# Patient Record
Sex: MALE | Race: WHITE | NOT HISPANIC OR LATINO | Employment: FULL TIME | URBAN - METROPOLITAN AREA
[De-identification: names, ages, dates, MRNs, and addresses within clinical notes are randomized per-mention and may not be internally consistent; named-entity substitution may affect disease eponyms.]

---

## 2023-06-25 DIAGNOSIS — R35.1 NOCTURIA: ICD-10-CM

## 2023-06-25 DIAGNOSIS — I10 ESSENTIAL (PRIMARY) HYPERTENSION: ICD-10-CM

## 2023-06-30 RX ORDER — LOSARTAN POTASSIUM 50 MG/1
TABLET ORAL
Qty: 90 TABLET | Refills: 1 | Status: SHIPPED | OUTPATIENT
Start: 2023-06-30 | End: 2023-12-26

## 2023-06-30 RX ORDER — TAMSULOSIN HYDROCHLORIDE 0.4 MG/1
CAPSULE ORAL
Qty: 90 CAPSULE | Refills: 1 | Status: SHIPPED | OUTPATIENT
Start: 2023-06-30 | End: 2023-12-26

## 2023-11-21 NOTE — PROGRESS NOTES
Subjective   Sunny Ray is a 58 y.o. male who presents for Sunny is here for physical exam.  HPI  Sunny is 58-year-old male is here for his yearly physical exam he takes medication as prescribed on losartan rosuvastatin tamsulosin for history of hypertension dyslipidemia and benign prostatic hypertrophy respectively patient denies fever chills nausea vomiting constipation diarrhea dysuria urgency frequency.  Review of Systems  10 systems are pertinent as above  Objective     Visit Vitals  /80   Pulse 74   Temp 36.5 °C (97.7 °F)   Resp 16      Physical Exam  HEENT: Atraumatic normocephalic the pupils are equal and round and reactive to light the sclerae nonicteric extraocular motion are intact.  Neck: Is supple without JVD no carotid bruits the trachea is midline there are no masses pulses are equal and bilateral with normal upstroke.  Skin: Normal.  Skin good texture.  Moist.  Good turgor.  No lesions, no rashes.  Lymph: No lymphadenopathy appreciated, no masses, no lesions  Lungs: Are clear to auscultation and percussion, good breath sounds bilaterally, no rhonchi, no wheezing, good diaphragmatic excursion.  Heart: Normal rate and normal rhythm S1, S2, no S3, no gallop, murmur or rub.  Abdomen: Soft, nontender, no organomegaly, good bowel sounds.    Extremities: Full range of motion, good pulses bilateral.  No cyanosis, no clubbing or edema.  Neuro: Cranial nerves II-XII are grossly intact there is no sensory or motor deficits.  Able to move all extremities.      Assessment/Plan     Patient is here for a physical exam fasting blood work    CBC BMP lipids AST ALT vitamin D 25-hydroxy    Prevention   Colonoscopy b02/21/23  PSA    Rt hip replaced dr hall  Doing well     Immunization  Flu vaccine  valeria just had covid  Pneumonia vaccine age 64  Shingrix no  COVID-vaccine had covid early November 2023    Muscle statins  for two weeks for   Patient prefers to take in the setting of elevated  calacium score      Continue with the low-fat, low-cholesterol diet,  I recommended Mediterranean diet, which include fish, chicken, vegetables and olive oil  Exercise daily for 30 minutes at least 3 times a week  Continue home medications    Hypertension  No added salt diet, do not and salt to your food  Try to exercise every other day for 30 minutes  Continue current medications    Enlarged prostate    External hemorrhoid colorectal surgery  Re commanded observation    Anxiety and depression  On lexapro 5 mg daily   Wellbutrin 450 mg daily  Follows with psychiatry     Diverticulosis colonoscopy 2/21/23  Problem List Items Addressed This Visit       Tubular adenoma of colon    Relevant Orders    Basic Metabolic Panel    Nocturia    Relevant Orders    PSA    Obstructive sleep apnea    Relevant Orders    CBC    Basic Metabolic Panel    S/P total right hip arthroplasty    SLAP tear of shoulder    Depression - Primary    Relevant Medications    escitalopram (Lexapro) 5 mg tablet    buPROPion XL (Wellbutrin XL) 150 mg 24 hr tablet    buPROPion XL (Wellbutrin XL) 300 mg 24 hr tablet    Dyslipidemia    Relevant Orders    Lipid Panel    AST    ALT    Basic Metabolic Panel    Arthralgia    Relevant Medications    aspirin 81 mg EC tablet    Pain in both hands    Relevant Orders    Referral to Orthopaedic Surgery    Physical exam         Sy Houston MD

## 2023-11-28 ENCOUNTER — OFFICE VISIT (OUTPATIENT)
Dept: PRIMARY CARE | Facility: CLINIC | Age: 58
End: 2023-11-28
Payer: COMMERCIAL

## 2023-11-28 VITALS
SYSTOLIC BLOOD PRESSURE: 124 MMHG | BODY MASS INDEX: 28.14 KG/M2 | RESPIRATION RATE: 16 BRPM | HEIGHT: 69 IN | HEART RATE: 74 BPM | WEIGHT: 190 LBS | TEMPERATURE: 97.7 F | DIASTOLIC BLOOD PRESSURE: 80 MMHG

## 2023-11-28 DIAGNOSIS — M79.641 PAIN IN BOTH HANDS: ICD-10-CM

## 2023-11-28 DIAGNOSIS — M79.642 PAIN IN BOTH HANDS: ICD-10-CM

## 2023-11-28 DIAGNOSIS — E78.5 DYSLIPIDEMIA: ICD-10-CM

## 2023-11-28 DIAGNOSIS — Z00.00 PHYSICAL EXAM: ICD-10-CM

## 2023-11-28 DIAGNOSIS — F32.A DEPRESSION, UNSPECIFIED DEPRESSION TYPE: Primary | ICD-10-CM

## 2023-11-28 DIAGNOSIS — D12.6 TUBULAR ADENOMA OF COLON: ICD-10-CM

## 2023-11-28 DIAGNOSIS — Z96.641 S/P TOTAL RIGHT HIP ARTHROPLASTY: ICD-10-CM

## 2023-11-28 DIAGNOSIS — G47.33 OBSTRUCTIVE SLEEP APNEA: ICD-10-CM

## 2023-11-28 DIAGNOSIS — R35.1 NOCTURIA: ICD-10-CM

## 2023-11-28 DIAGNOSIS — M25.50 ARTHRALGIA, UNSPECIFIED JOINT: ICD-10-CM

## 2023-11-28 DIAGNOSIS — S43.431A SUPERIOR GLENOID LABRUM LESION OF RIGHT SHOULDER, INITIAL ENCOUNTER: ICD-10-CM

## 2023-11-28 PROBLEM — S43.439A SLAP TEAR OF SHOULDER: Status: ACTIVE | Noted: 2023-11-28

## 2023-11-28 PROCEDURE — 84460 ALANINE AMINO (ALT) (SGPT): CPT | Performed by: INTERNAL MEDICINE

## 2023-11-28 PROCEDURE — 84450 TRANSFERASE (AST) (SGOT): CPT | Performed by: INTERNAL MEDICINE

## 2023-11-28 PROCEDURE — 99396 PREV VISIT EST AGE 40-64: CPT | Performed by: INTERNAL MEDICINE

## 2023-11-28 PROCEDURE — 85025 COMPLETE CBC W/AUTO DIFF WBC: CPT | Performed by: INTERNAL MEDICINE

## 2023-11-28 PROCEDURE — 80061 LIPID PANEL: CPT | Performed by: INTERNAL MEDICINE

## 2023-11-28 PROCEDURE — 84153 ASSAY OF PSA TOTAL: CPT | Performed by: INTERNAL MEDICINE

## 2023-11-28 PROCEDURE — 80048 BASIC METABOLIC PNL TOTAL CA: CPT | Performed by: INTERNAL MEDICINE

## 2023-11-28 PROCEDURE — 1036F TOBACCO NON-USER: CPT | Performed by: INTERNAL MEDICINE

## 2023-11-28 RX ORDER — ASPIRIN 81 MG/1
81 TABLET ORAL DAILY
Qty: 30 TABLET | Refills: 11
Start: 2023-11-28 | End: 2023-12-04

## 2023-11-28 RX ORDER — ESCITALOPRAM OXALATE 5 MG/1
5 TABLET ORAL DAILY
Qty: 30 TABLET | Refills: 2
Start: 2023-11-28 | End: 2024-02-26

## 2023-11-28 RX ORDER — BUPROPION HYDROCHLORIDE 300 MG/1
300 TABLET ORAL EVERY MORNING
Qty: 30 TABLET | Refills: 1
Start: 2023-11-28 | End: 2024-01-27

## 2023-11-28 RX ORDER — BUPROPION HYDROCHLORIDE 150 MG/1
150 TABLET ORAL EVERY MORNING
Qty: 30 TABLET | Refills: 1
Start: 2023-11-28 | End: 2024-01-27

## 2023-11-28 ASSESSMENT — PAIN SCALES - GENERAL: PAINLEVEL: 0-NO PAIN

## 2023-11-28 ASSESSMENT — ENCOUNTER SYMPTOMS
DEPRESSION: 0
LOSS OF SENSATION IN FEET: 0
OCCASIONAL FEELINGS OF UNSTEADINESS: 0

## 2023-12-03 DIAGNOSIS — M25.50 ARTHRALGIA, UNSPECIFIED JOINT: ICD-10-CM

## 2023-12-04 RX ORDER — ASPIRIN 81 MG/1
81 TABLET ORAL DAILY
Qty: 90 TABLET | Refills: 3 | Status: SHIPPED | OUTPATIENT
Start: 2023-12-04

## 2023-12-15 DIAGNOSIS — M79.641 BILATERAL HAND PAIN: Primary | ICD-10-CM

## 2023-12-15 DIAGNOSIS — M79.642 BILATERAL HAND PAIN: Primary | ICD-10-CM

## 2023-12-18 ENCOUNTER — ANCILLARY PROCEDURE (OUTPATIENT)
Dept: RADIOLOGY | Facility: CLINIC | Age: 58
End: 2023-12-18
Payer: COMMERCIAL

## 2023-12-18 ENCOUNTER — OFFICE VISIT (OUTPATIENT)
Dept: ORTHOPEDIC SURGERY | Facility: CLINIC | Age: 58
End: 2023-12-18
Payer: COMMERCIAL

## 2023-12-18 VITALS — HEIGHT: 69 IN | BODY MASS INDEX: 28.14 KG/M2 | WEIGHT: 190 LBS

## 2023-12-18 DIAGNOSIS — M25.522 LEFT ELBOW PAIN: ICD-10-CM

## 2023-12-18 DIAGNOSIS — M79.642 PAIN IN BOTH HANDS: ICD-10-CM

## 2023-12-18 DIAGNOSIS — M19.031 ARTHRITIS OF RIGHT WRIST: Primary | ICD-10-CM

## 2023-12-18 DIAGNOSIS — M79.641 PAIN IN BOTH HANDS: ICD-10-CM

## 2023-12-18 DIAGNOSIS — M79.641 BILATERAL HAND PAIN: ICD-10-CM

## 2023-12-18 DIAGNOSIS — M79.642 BILATERAL HAND PAIN: ICD-10-CM

## 2023-12-18 PROCEDURE — 73080 X-RAY EXAM OF ELBOW: CPT | Mod: LT,FY

## 2023-12-18 PROCEDURE — 73080 X-RAY EXAM OF ELBOW: CPT | Mod: LEFT SIDE | Performed by: RADIOLOGY

## 2023-12-18 PROCEDURE — 99214 OFFICE O/P EST MOD 30 MIN: CPT | Performed by: ORTHOPAEDIC SURGERY

## 2023-12-18 PROCEDURE — 73130 X-RAY EXAM OF HAND: CPT | Mod: 50,FY

## 2023-12-18 PROCEDURE — 20605 DRAIN/INJ JOINT/BURSA W/O US: CPT | Performed by: ORTHOPAEDIC SURGERY

## 2023-12-18 PROCEDURE — 1036F TOBACCO NON-USER: CPT | Performed by: ORTHOPAEDIC SURGERY

## 2023-12-18 PROCEDURE — L3908 WHO COCK-UP NONMOLDE PRE OTS: HCPCS | Performed by: ORTHOPAEDIC SURGERY

## 2023-12-18 PROCEDURE — 73130 X-RAY EXAM OF HAND: CPT | Mod: BILATERAL PROCEDURE | Performed by: RADIOLOGY

## 2023-12-18 RX ORDER — LIDOCAINE HYDROCHLORIDE 10 MG/ML
1 INJECTION INFILTRATION; PERINEURAL
Status: COMPLETED | OUTPATIENT
Start: 2023-12-18 | End: 2023-12-18

## 2023-12-18 RX ORDER — TRIAMCINOLONE ACETONIDE 40 MG/ML
40 INJECTION, SUSPENSION INTRA-ARTICULAR; INTRAMUSCULAR
Status: COMPLETED | OUTPATIENT
Start: 2023-12-18 | End: 2023-12-18

## 2023-12-18 RX ADMIN — LIDOCAINE HYDROCHLORIDE 1 ML: 10 INJECTION INFILTRATION; PERINEURAL at 18:52

## 2023-12-18 RX ADMIN — TRIAMCINOLONE ACETONIDE 40 MG: 40 INJECTION, SUSPENSION INTRA-ARTICULAR; INTRAMUSCULAR at 18:52

## 2023-12-18 ASSESSMENT — PAIN - FUNCTIONAL ASSESSMENT: PAIN_FUNCTIONAL_ASSESSMENT: 0-10

## 2023-12-18 ASSESSMENT — PAIN DESCRIPTION - DESCRIPTORS: DESCRIPTORS: ACHING

## 2023-12-18 ASSESSMENT — PAIN SCALES - GENERAL: PAINLEVEL_OUTOF10: 4

## 2023-12-18 NOTE — PROGRESS NOTES
58-year-old right-hand-dominant  presents today for evaluation of right wrist and left elbow pain.  His primary complaint is of pain and swelling of the dorsal aspect of the right wrist.  He has difficulty with forceful gripping and weightbearing type activities.  He has noticed some slight stiffness with wrist range of motion.  No preceding trauma.  He has not had any formal workup or treatment.  On his left upper extremity he has pain over the lateral aspect of the left elbow.  This was very noticeable during the golf season.  Pain with golfing type activities.  He also has not had any formal workup or treatment for his left elbow symptoms.    Patient Active Problem List   Diagnosis    Tubular adenoma of colon    Nocturia    Obstructive sleep apnea    S/P total right hip arthroplasty    SLAP tear of shoulder    Depression    Dyslipidemia    Arthralgia    Pain in both hands    Physical exam     Past Surgical History:   Procedure Laterality Date    COLONOSCOPY  05/13/2014    Colonoscopy (Fiberoptic)    HERNIA REPAIR  05/13/2014    Hernia Repair    SHOULDER SURGERY  05/13/2014    Shoulder Surgery    TONSILLECTOMY  05/13/2014    Tonsillectomy     No family history on file.  Current Outpatient Medications on File Prior to Visit   Medication Sig Dispense Refill    aspirin 81 mg EC tablet TAKE 1 TABLET BY MOUTH EVERY DAY 90 tablet 3    buPROPion XL (Wellbutrin XL) 150 mg 24 hr tablet Take 1 tablet (150 mg) by mouth once daily in the morning. Do not crush, chew, or split. 30 tablet 1    buPROPion XL (Wellbutrin XL) 300 mg 24 hr tablet Take 1 tablet (300 mg) by mouth once daily in the morning. Do not crush, chew, or split. 30 tablet 1    escitalopram (Lexapro) 5 mg tablet Take 1 tablet (5 mg) by mouth once daily. 30 tablet 2    losartan (Cozaar) 50 mg tablet TAKE 1 TABLET BY MOUTH EVERY DAY 90 tablet 1    rosuvastatin (Crestor) 20 mg tablet TAKE 1 TABLET BY MOUTH EVERY DAY 90 tablet 2    tamsulosin (Flomax) 0.4 mg  24 hr capsule TAKE 1 CAPSULE BY MOUTH EVERY DAY 90 capsule 1     No current facility-administered medications on file prior to visit.     No Known Allergies    Physical Examination Findings:  Constitutional: Appears well-developed and well-nourished.  Head: Normocephalic and atraumatic.  Eyes: Pupils are equal and round.  Cardiovascular: Intact distal pulses.   Respiratory: Effort normal. No respiratory distress.  Neurologic: Alert and oriented to person, place, and time.  Skin: Skin is warm and dry.  Hematologic / Lympahtic: No lymphedema, lymphangitis.  Psychiatric: normal mood and affect. Behavior is normal.   Musculoskeletal: Left upper extremity examination reveals tenderness to palpation and swelling over the common extensor origin.  Pain with resisted wrist extension and middle finger extension.  Pain with forceful .  Examination of the right wrist reveals fourth dorsal compartment tenosynovial swelling.  Tenderness to palpation dorsally over the fourth compartment and over the radiocarpal joint.  More significant tenderness over the scaphoid trapezial joint dorsal radial aspect of the wrist.  Slight loss of terminal wrist flexion and wrist extension compared to the contralateral wrist.  Full digital range of motion.  No tenderness over the thumb CMC joint.  Good  strength with pain at most forceful terminal .    Review of x-rays right wrist obtained today demonstrate advanced arthritic changes at the STT joint.  For comparison x-rays of the left wrist demonstrate only mild arthritic changes at the STT joint.    Review of x-rays left elbow obtained today demonstrate good preservation of joint surfaces.  Some slight sclerosis in the subcortical bone at the lateral epicondyle consistent with chronic epicondylitis.    Impression: Left elbow lateral epicondylitis, right wrist arthritis with extensor tenosynovitis.    Plan: For his left elbow we have given him a list of gentle stretching and  strengthening exercises to help with his epicondylitis symptoms.  Recommend anti-inflammatory medications and activity modifications.  Functional tennis elbow strapping is something that he can consider for activities.  His symptoms should improve but if they do not he can follow-up with Dr. Curtis for consideration of Tenex type procedures.  For his right wrist pain this is likely a combination of some mild extensor tenosynovitis and the severity of his STT arthritis.  We have provided him with a wrist brace.  Have offered to give him an injection into his right wrist today as well.    M Inj/Asp: R radiocarpal on 12/18/2023 6:52 PM  Indications: pain  Details: 25 G needle, dorsal approach  Medications: 40 mg triamcinolone acetonide 40 mg/mL; 1 mL lidocaine 10 mg/mL (1 %)  Outcome: tolerated well, no immediate complications  Procedure, treatment alternatives, risks and benefits explained, specific risks discussed. Consent was given by the patient. Immediately prior to procedure a time out was called to verify the correct patient, procedure, equipment, support staff and site/side marked as required. Patient was prepped and draped in the usual sterile fashion.       Patient was prescribed a cock-up wrist splint for arthritis. The patient has weakness, instability and/or deformity of their right wrist which requires stabilization from this orthosis to improve their function.      Verbal and written instructions for the use, wear schedule, cleaning and application of this item were given.  Patient was instructed that should the brace result in increased pain, decreased sensation, increased swelling, or an overall worsening of their medical condition, to please contact our office immediately.     Orthotic management and training was provided for skin care, modifications due to healing tissues, edema changes, interruption in skin integrity, and safety precautions with the orthosis.      Return as needed for recurrence or  progression of problems .    Herrera Helm MD    [facility] [employer]  Department of Orthopaedic Surgery  Chief of Hand and Upper Extremity Surgery  [facility]    Dictation performed with the use of voice recognition software. Syntax and grammatical errors may exist.

## 2023-12-18 NOTE — LETTER
December 18, 2023     Sy Houston MD  [address] [facility] [address]    Patient: Sunny Ray   YOB: 1965   Date of Visit: 12/18/2023       Dear Dr. Sy Houston MD:    Thank you for referring Sunny Ray to me for evaluation. Below are my notes for this consultation.  If you have questions, please do not hesitate to call me. I look forward to following your patient along with you.       Sincerely,     Herrera Helm MD      CC: No Recipients  ______________________________________________________________________________________    58-year-old right-hand-dominant  presents today for evaluation of right wrist and left elbow pain.  His primary complaint is of pain and swelling of the dorsal aspect of the right wrist.  He has difficulty with forceful gripping and weightbearing type activities.  He has noticed some slight stiffness with wrist range of motion.  No preceding trauma.  He has not had any formal workup or treatment.  On his left upper extremity he has pain over the lateral aspect of the left elbow.  This was very noticeable during the golf season.  Pain with golfing type activities.  He also has not had any formal workup or treatment for his left elbow symptoms.    Patient Active Problem List   Diagnosis   • Tubular adenoma of colon   • Nocturia   • Obstructive sleep apnea   • S/P total right hip arthroplasty   • SLAP tear of shoulder   • Depression   • Dyslipidemia   • Arthralgia   • Pain in both hands   • Physical exam     Past Surgical History:   Procedure Laterality Date   • COLONOSCOPY  05/13/2014    Colonoscopy (Fiberoptic)   • HERNIA REPAIR  05/13/2014    Hernia Repair   • SHOULDER SURGERY  05/13/2014    Shoulder Surgery   • TONSILLECTOMY  05/13/2014    Tonsillectomy     No family history on file.  Current Outpatient Medications on File Prior to Visit   Medication Sig Dispense Refill   • aspirin 81 mg EC tablet TAKE 1 TABLET BY MOUTH EVERY DAY 90  tablet 3   • buPROPion XL (Wellbutrin XL) 150 mg 24 hr tablet Take 1 tablet (150 mg) by mouth once daily in the morning. Do not crush, chew, or split. 30 tablet 1   • buPROPion XL (Wellbutrin XL) 300 mg 24 hr tablet Take 1 tablet (300 mg) by mouth once daily in the morning. Do not crush, chew, or split. 30 tablet 1   • escitalopram (Lexapro) 5 mg tablet Take 1 tablet (5 mg) by mouth once daily. 30 tablet 2   • losartan (Cozaar) 50 mg tablet TAKE 1 TABLET BY MOUTH EVERY DAY 90 tablet 1   • rosuvastatin (Crestor) 20 mg tablet TAKE 1 TABLET BY MOUTH EVERY DAY 90 tablet 2   • tamsulosin (Flomax) 0.4 mg 24 hr capsule TAKE 1 CAPSULE BY MOUTH EVERY DAY 90 capsule 1     No current facility-administered medications on file prior to visit.     No Known Allergies    Physical Examination Findings:  Constitutional: Appears well-developed and well-nourished.  Head: Normocephalic and atraumatic.  Eyes: Pupils are equal and round.  Cardiovascular: Intact distal pulses.   Respiratory: Effort normal. No respiratory distress.  Neurologic: Alert and oriented to person, place, and time.  Skin: Skin is warm and dry.  Hematologic / Lympahtic: No lymphedema, lymphangitis.  Psychiatric: normal mood and affect. Behavior is normal.   Musculoskeletal: Left upper extremity examination reveals tenderness to palpation and swelling over the common extensor origin.  Pain with resisted wrist extension and middle finger extension.  Pain with forceful .  Examination of the right wrist reveals fourth dorsal compartment tenosynovial swelling.  Tenderness to palpation dorsally over the fourth compartment and over the radiocarpal joint.  More significant tenderness over the scaphoid trapezial joint dorsal radial aspect of the wrist.  Slight loss of terminal wrist flexion and wrist extension compared to the contralateral wrist.  Full digital range of motion.  No tenderness over the thumb CMC joint.  Good  strength with pain at most forceful  terminal .    Review of x-rays right wrist obtained today demonstrate advanced arthritic changes at the STT joint.  For comparison x-rays of the left wrist demonstrate only mild arthritic changes at the STT joint.    Review of x-rays left elbow obtained today demonstrate good preservation of joint surfaces.  Some slight sclerosis in the subcortical bone at the lateral epicondyle consistent with chronic epicondylitis.    Impression: Left elbow lateral epicondylitis, right wrist arthritis with extensor tenosynovitis.    Plan: For his left elbow we have given him a list of gentle stretching and strengthening exercises to help with his epicondylitis symptoms.  Recommend anti-inflammatory medications and activity modifications.  Functional tennis elbow strapping is something that he can consider for activities.  His symptoms should improve but if they do not he can follow-up with Dr. Curtis for consideration of Tenex type procedures.  For his right wrist pain this is likely a combination of some mild extensor tenosynovitis and the severity of his STT arthritis.  We have provided him with a wrist brace.  Have offered to give him an injection into his right wrist today as well.    M Inj/Asp: R radiocarpal on 12/18/2023 6:52 PM  Indications: pain  Details: 25 G needle, dorsal approach  Medications: 40 mg triamcinolone acetonide 40 mg/mL; 1 mL lidocaine 10 mg/mL (1 %)  Outcome: tolerated well, no immediate complications  Procedure, treatment alternatives, risks and benefits explained, specific risks discussed. Consent was given by the patient. Immediately prior to procedure a time out was called to verify the correct patient, procedure, equipment, support staff and site/side marked as required. Patient was prepped and draped in the usual sterile fashion.       Patient was prescribed a cock-up wrist splint for arthritis. The patient has weakness, instability and/or deformity of their right wrist which requires  stabilization from this orthosis to improve their function.      Verbal and written instructions for the use, wear schedule, cleaning and application of this item were given.  Patient was instructed that should the brace result in increased pain, decreased sensation, increased swelling, or an overall worsening of their medical condition, to please contact our office immediately.     Orthotic management and training was provided for skin care, modifications due to healing tissues, edema changes, interruption in skin integrity, and safety precautions with the orthosis.      Return as needed for recurrence or progression of problems .    Herrera Helm MD    [facility] [employer]  Department of Orthopaedic Surgery  Chief of Hand and Upper Extremity Surgery  [facility]    Dictation performed with the use of voice recognition software. Syntax and grammatical errors may exist.

## 2023-12-24 DIAGNOSIS — I10 ESSENTIAL (PRIMARY) HYPERTENSION: ICD-10-CM

## 2023-12-24 DIAGNOSIS — R35.1 NOCTURIA: ICD-10-CM

## 2023-12-24 DIAGNOSIS — E78.5 HYPERLIPIDEMIA, UNSPECIFIED: ICD-10-CM

## 2023-12-26 RX ORDER — ROSUVASTATIN CALCIUM 20 MG/1
TABLET, COATED ORAL
Qty: 90 TABLET | Refills: 2 | Status: SHIPPED | OUTPATIENT
Start: 2023-12-26

## 2023-12-26 RX ORDER — LOSARTAN POTASSIUM 50 MG/1
TABLET ORAL
Qty: 90 TABLET | Refills: 1 | Status: SHIPPED | OUTPATIENT
Start: 2023-12-26 | End: 2024-05-29 | Stop reason: WASHOUT

## 2023-12-26 RX ORDER — TAMSULOSIN HYDROCHLORIDE 0.4 MG/1
CAPSULE ORAL
Qty: 90 CAPSULE | Refills: 1 | Status: SHIPPED | OUTPATIENT
Start: 2023-12-26

## 2024-02-22 DIAGNOSIS — K57.92 DIVERTICULITIS: Primary | ICD-10-CM

## 2024-02-22 RX ORDER — LEVOFLOXACIN 500 MG/1
500 TABLET, FILM COATED ORAL DAILY
Qty: 10 TABLET | Refills: 0 | Status: SHIPPED | OUTPATIENT
Start: 2024-02-22 | End: 2024-05-29 | Stop reason: WASHOUT

## 2024-02-22 RX ORDER — LEVOFLOXACIN 500 MG/1
1 TABLET, FILM COATED ORAL DAILY
COMMUNITY
Start: 2022-10-04 | End: 2024-02-22 | Stop reason: SDUPTHER

## 2024-02-22 RX ORDER — METRONIDAZOLE 500 MG/1
TABLET ORAL
COMMUNITY
Start: 2022-10-04 | End: 2024-02-22 | Stop reason: SDUPTHER

## 2024-02-22 RX ORDER — METRONIDAZOLE 500 MG/1
500 TABLET ORAL 3 TIMES DAILY
Qty: 30 TABLET | Refills: 0 | Status: SHIPPED | OUTPATIENT
Start: 2024-02-22 | End: 2024-05-29 | Stop reason: WASHOUT

## 2024-03-21 ENCOUNTER — TELEPHONE (OUTPATIENT)
Dept: PRIMARY CARE | Facility: CLINIC | Age: 59
End: 2024-03-21
Payer: COMMERCIAL

## 2024-03-21 DIAGNOSIS — G47.33 OSA (OBSTRUCTIVE SLEEP APNEA): Primary | ICD-10-CM

## 2024-05-17 ENCOUNTER — OFFICE VISIT (OUTPATIENT)
Dept: ORTHOPEDIC SURGERY | Facility: HOSPITAL | Age: 59
End: 2024-05-17
Payer: COMMERCIAL

## 2024-05-17 ENCOUNTER — HOSPITAL ENCOUNTER (OUTPATIENT)
Dept: RADIOLOGY | Facility: HOSPITAL | Age: 59
Discharge: HOME | End: 2024-05-17
Payer: COMMERCIAL

## 2024-05-17 DIAGNOSIS — G89.29 CHRONIC PAIN OF BOTH SHOULDERS: ICD-10-CM

## 2024-05-17 DIAGNOSIS — M25.512 CHRONIC PAIN OF BOTH SHOULDERS: ICD-10-CM

## 2024-05-17 DIAGNOSIS — M25.511 CHRONIC PAIN OF BOTH SHOULDERS: ICD-10-CM

## 2024-05-17 DIAGNOSIS — M25.511 RIGHT SHOULDER PAIN: ICD-10-CM

## 2024-05-17 PROCEDURE — 2500000004 HC RX 250 GENERAL PHARMACY W/ HCPCS (ALT 636 FOR OP/ED): Performed by: PHYSICIAN ASSISTANT

## 2024-05-17 PROCEDURE — 73030 X-RAY EXAM OF SHOULDER: CPT | Mod: RIGHT SIDE | Performed by: RADIOLOGY

## 2024-05-17 PROCEDURE — 99213 OFFICE O/P EST LOW 20 MIN: CPT | Performed by: PHYSICIAN ASSISTANT

## 2024-05-17 PROCEDURE — 20610 DRAIN/INJ JOINT/BURSA W/O US: CPT | Mod: RT | Performed by: PHYSICIAN ASSISTANT

## 2024-05-17 PROCEDURE — 73030 X-RAY EXAM OF SHOULDER: CPT | Mod: RT

## 2024-05-17 PROCEDURE — 2500000005 HC RX 250 GENERAL PHARMACY W/O HCPCS: Performed by: PHYSICIAN ASSISTANT

## 2024-05-17 RX ORDER — VILAZODONE HYDROCHLORIDE 10 MG/1
15 TABLET ORAL
COMMUNITY
Start: 2024-04-22 | End: 2024-08-13

## 2024-05-17 RX ADMIN — LIDOCAINE HYDROCHLORIDE 10 ML: 10 INJECTION, SOLUTION INFILTRATION; PERINEURAL at 10:53

## 2024-05-17 RX ADMIN — METHYLPREDNISOLONE ACETATE 40 MG: 40 INJECTION, SUSPENSION INTRA-ARTICULAR; INTRALESIONAL; INTRAMUSCULAR; INTRASYNOVIAL; SOFT TISSUE at 10:53

## 2024-05-17 ASSESSMENT — PAIN DESCRIPTION - DESCRIPTORS: DESCRIPTORS: ACHING;DULL;SHARP

## 2024-05-17 ASSESSMENT — PAIN - FUNCTIONAL ASSESSMENT: PAIN_FUNCTIONAL_ASSESSMENT: 0-10

## 2024-05-25 NOTE — PROGRESS NOTES
Subjective   Sunny Ray is a 59 y.o. male who presents for Sunny is here for follow-up obstructive sleep apnea CPAP.  HPI  Sunny is 59-year-old male is here for his yearly physical exam he takes medication as prescribed on losartan rosuvastatin tamsulosin for history of hypertension dyslipidemia and benign prostatic hypertrophy respectively patient denies fever chills nausea vomiting constipation diarrhea dysuria urgency frequency.  Patient has history of MADDY on CPAP, Patient claims machine broke using a new  CPAP with good results, sleeping well no am fatigue good day energy.     Review of Systems  10 systems are pertinent as above  Objective     Visit Vitals  /82   Pulse 78   Temp 36.5 °C (97.7 °F)   Resp 16        Physical Exam  HEENT: Atraumatic normocephalic the pupils are equal and round and reactive to light the sclerae nonicteric extraocular motion are intact.  Neck: Is supple without JVD no carotid bruits the trachea is midline there are no masses pulses are equal and bilateral with normal upstroke.  Skin: Normal.  Skin good texture.  Moist.  Good turgor.  No lesions, no rashes.  Lymph: No lymphadenopathy appreciated, no masses, no lesions  Lungs: Are clear to auscultation and percussion, good breath sounds bilaterally, no rhonchi, no wheezing, good diaphragmatic excursion.  Heart: Normal rate and normal rhythm S1, S2, no S3, no gallop, murmur or rub.  Abdomen: Soft, nontender, no organomegaly, good bowel sounds.    Extremities: Full range of motion, good pulses bilateral.  No cyanosis, no clubbing or edema.  Neuro: Cranial nerves II-XII are grossly intact there is no sensory or motor deficits.  Able to move all extremities.      Assessment/Plan     MADDY  On Auto CPAP  Nightly use  Doing well sleeping well no snoring good daily energy    Prevention   Colonoscopy 02/21/23  PSA 11/2023    Rt hip replaced dr hall  Doing well     Immunization  Flu vaccine  valeria just had  covid  Pneumonia vaccine age 64  Shingrix no  COVID-vaccine had covid early November 2023    Muscle statins  for two weeks for   Patient prefers to take in the setting of elevated calacium score      Slap injury left shoulder  Follows with Ortho     Continue with the low-fat, low-cholesterol diet,  I recommended Mediterranean diet, which include fish, chicken, vegetables and olive oil  Exercise daily for 30 minutes at least 3 times a week  Continue home medications    Hypertension  No added salt diet, do not and salt to your food  Try to exercise every other day for 30 minutes  Continue current medications    Enlarged prostate  nocturia    External hemorrhoid colorectal surgery  Re commanded observation    Anxiety and depression  On lexapro 5 mg daily   Wellbutrin 450 mg daily  Follows with psychiatry     Diverticulosis colonoscopy 2/21/23  Problem List Items Addressed This Visit    None          Sy Houston MD

## 2024-05-28 RX ORDER — METHYLPREDNISOLONE ACETATE 40 MG/ML
40 INJECTION, SUSPENSION INTRA-ARTICULAR; INTRALESIONAL; INTRAMUSCULAR; SOFT TISSUE
Status: COMPLETED | OUTPATIENT
Start: 2024-05-17 | End: 2024-05-17

## 2024-05-28 RX ORDER — LIDOCAINE HYDROCHLORIDE 10 MG/ML
10 INJECTION INFILTRATION; PERINEURAL
Status: COMPLETED | OUTPATIENT
Start: 2024-05-17 | End: 2024-05-17

## 2024-05-28 NOTE — PROGRESS NOTES
Pleasant gentleman here today regarding right shoulder pain.  He is having increasing pain in the shoulder without specific new injury.  Has had corticosteroid injections in the past that have been beneficial.  Updated Xrays reviewed.  No gross fx or significant OA.  On exam today he has full range of motion but only mild weakness on exam likely secondary to pain.  He would like to repeat a corticosteroid injection today and I think that is reasonable.        L Inj/Asp: R subacromial bursa on 5/17/2024 10:53 AM  Indications: pain  Details: 22 G needle, posterior approach  Medications: 40 mg methylPREDNISolone acetate 40 mg/mL; 10 mL lidocaine 10 mg/mL (1 %)    Under sterile conditions the right shoulder was injected with 10cc of lidocaine 40mg Depo.  The patient tolerated the procedure well.  There were no apparent complications.  Sterile bandage was applied.  Procedure, treatment alternatives, risks and benefits explained, specific risks discussed. Consent was given by the patient. Immediately prior to procedure a time out was called to verify the correct patient, procedure, equipment, support staff and site/side marked as required. Patient was prepped and draped in the usual sterile fashion.                 Katy Thompson PA-C

## 2024-05-29 ENCOUNTER — OFFICE VISIT (OUTPATIENT)
Dept: PRIMARY CARE | Facility: CLINIC | Age: 59
End: 2024-05-29
Payer: COMMERCIAL

## 2024-05-29 VITALS
BODY MASS INDEX: 27.85 KG/M2 | RESPIRATION RATE: 16 BRPM | HEART RATE: 78 BPM | TEMPERATURE: 97.7 F | DIASTOLIC BLOOD PRESSURE: 82 MMHG | SYSTOLIC BLOOD PRESSURE: 122 MMHG | WEIGHT: 188 LBS | HEIGHT: 69 IN

## 2024-05-29 DIAGNOSIS — S43.431A SUPERIOR GLENOID LABRUM LESION OF RIGHT SHOULDER, INITIAL ENCOUNTER: ICD-10-CM

## 2024-05-29 DIAGNOSIS — E78.5 DYSLIPIDEMIA: ICD-10-CM

## 2024-05-29 DIAGNOSIS — G47.33 OBSTRUCTIVE SLEEP APNEA: Primary | ICD-10-CM

## 2024-05-29 PROBLEM — M16.10 ARTHRITIS OF HIP: Status: ACTIVE | Noted: 2024-05-29

## 2024-05-29 PROCEDURE — 99214 OFFICE O/P EST MOD 30 MIN: CPT | Performed by: INTERNAL MEDICINE

## 2024-05-29 PROCEDURE — 1036F TOBACCO NON-USER: CPT | Performed by: INTERNAL MEDICINE

## 2024-05-29 ASSESSMENT — ENCOUNTER SYMPTOMS
DEPRESSION: 0
LOSS OF SENSATION IN FEET: 0
OCCASIONAL FEELINGS OF UNSTEADINESS: 0

## 2024-05-29 ASSESSMENT — PAIN SCALES - GENERAL: PAINLEVEL: 0-NO PAIN

## 2024-06-07 NOTE — PROGRESS NOTES
[facility]  Hand and Upper Extremity Service  Follow up visit         Follow up for: Right wrist     Interval History: Received a right radiocarpal injection on last visit and did well but symptoms are starting to return. He would like repeat injections today.               Past medical history, medications, allergies, surgical history and review of systems are reviewed and otherwise unchanged when compared to last visit on 12/18/23         Examination:  Constitutional: Oriented to person, place, and time.  Appears well-developed and well-nourished.  Head: Normocephalic and atraumatic.  Eyes: Pupils are equal, round, and reactive to light.  Cardiovascular: Intact distal pulses.  Pulmonary/Chest/Breast: Effort normal. No respiratory distress.  Neurological: Alert and oriented to person, place, and time.  Skin: Skin is warm and dry.  Psychiatric: normal mood and affect.  Behavior is normal.  Musculoskeletal: Right upper extremity reveals tenderness to palpation dorsally over the STT joint. Full range of motion of fingers but has some loss of terminal wrist flexion and extension and radial and ulnar deviation. No pain at base of thumb. No tenderness across radiocarpal joint.       Personal Interpretation of Diagnostic studies: Review of prior xrays demonstrate advanced arthritic changes at the STT joint.        Impression: Right wrist STT arthritis       Plan: We gave him a right wrist STT joint injection today. He can return to his activities as tolerated      In Office Procedures Performed: Right STT joint injection   S Inj/Asp (Right wrist STT joint) on 6/10/2024 6:14 PM  Indications: pain  Details: 25 G needle, dorsal approach  Medications: 20 mg triamcinolone acetonide 40 mg/mL; 0.5 mL lidocaine 10 mg/mL (1 %)  Outcome: tolerated well, no immediate complications  Procedure, treatment alternatives, risks and benefits explained, specific risks discussed. Consent was given by the  patient. Immediately prior to procedure a time out was called to verify the correct patient, procedure, equipment, support staff and site/side marked as required. Patient was prepped and draped in the usual sterile fashion.             Follow up: As needed             Herrera Helm MD  [facility]  Department of Orthopaedic Surgery  Hand and Upper Extremity Reconstruction    Scribe Attestation  By signing my name below, I, Marry Haywood , Scribe   attest that this documentation has been prepared under the direction and in the presence of Dr. Herrera Helm.    Dictation performed with the use of voice recognition software.  Syntax and grammatical errors may exist.

## 2024-06-10 ENCOUNTER — OFFICE VISIT (OUTPATIENT)
Dept: ORTHOPEDIC SURGERY | Facility: CLINIC | Age: 59
End: 2024-06-10
Payer: COMMERCIAL

## 2024-06-10 VITALS — HEIGHT: 69 IN | BODY MASS INDEX: 27.85 KG/M2 | WEIGHT: 188 LBS

## 2024-06-10 DIAGNOSIS — M19.031 ARTHRITIS OF RIGHT WRIST: Primary | ICD-10-CM

## 2024-06-10 PROCEDURE — 99213 OFFICE O/P EST LOW 20 MIN: CPT | Performed by: ORTHOPAEDIC SURGERY

## 2024-06-10 PROCEDURE — 1036F TOBACCO NON-USER: CPT | Performed by: ORTHOPAEDIC SURGERY

## 2024-06-10 PROCEDURE — 20605 DRAIN/INJ JOINT/BURSA W/O US: CPT | Performed by: ORTHOPAEDIC SURGERY

## 2024-06-10 RX ORDER — LIDOCAINE HYDROCHLORIDE 10 MG/ML
0.5 INJECTION INFILTRATION; PERINEURAL
Status: COMPLETED | OUTPATIENT
Start: 2024-06-10 | End: 2024-06-10

## 2024-06-10 RX ORDER — TRIAMCINOLONE ACETONIDE 40 MG/ML
20 INJECTION, SUSPENSION INTRA-ARTICULAR; INTRAMUSCULAR
Status: COMPLETED | OUTPATIENT
Start: 2024-06-10 | End: 2024-06-10

## 2024-06-10 RX ADMIN — LIDOCAINE HYDROCHLORIDE 0.5 ML: 10 INJECTION INFILTRATION; PERINEURAL at 18:14

## 2024-06-10 RX ADMIN — TRIAMCINOLONE ACETONIDE 20 MG: 40 INJECTION, SUSPENSION INTRA-ARTICULAR; INTRAMUSCULAR at 18:14

## 2024-06-10 ASSESSMENT — PAIN - FUNCTIONAL ASSESSMENT: PAIN_FUNCTIONAL_ASSESSMENT: 0-10

## 2024-06-10 ASSESSMENT — PAIN SCALES - GENERAL: PAINLEVEL_OUTOF10: 5 - MODERATE PAIN

## 2024-06-10 ASSESSMENT — PAIN DESCRIPTION - DESCRIPTORS: DESCRIPTORS: ACHING;SORE

## 2024-06-24 DIAGNOSIS — R35.1 NOCTURIA: ICD-10-CM

## 2024-06-24 DIAGNOSIS — I10 ESSENTIAL (PRIMARY) HYPERTENSION: ICD-10-CM

## 2024-06-24 RX ORDER — LOSARTAN POTASSIUM 50 MG/1
TABLET ORAL
Qty: 90 TABLET | Refills: 1 | Status: SHIPPED | OUTPATIENT
Start: 2024-06-24

## 2024-06-24 RX ORDER — TAMSULOSIN HYDROCHLORIDE 0.4 MG/1
CAPSULE ORAL
Qty: 90 CAPSULE | Refills: 1 | Status: SHIPPED | OUTPATIENT
Start: 2024-06-24

## 2024-09-16 ENCOUNTER — TELEPHONE (OUTPATIENT)
Dept: PRIMARY CARE | Facility: CLINIC | Age: 59
End: 2024-09-16
Payer: COMMERCIAL

## 2024-09-21 DIAGNOSIS — E78.5 HYPERLIPIDEMIA, UNSPECIFIED: ICD-10-CM

## 2024-09-23 RX ORDER — ROSUVASTATIN CALCIUM 20 MG/1
TABLET, COATED ORAL
Qty: 90 TABLET | Refills: 2 | Status: SHIPPED | OUTPATIENT
Start: 2024-09-23

## 2024-11-18 DIAGNOSIS — I10 ESSENTIAL (PRIMARY) HYPERTENSION: ICD-10-CM

## 2024-11-18 DIAGNOSIS — R35.1 NOCTURIA: ICD-10-CM

## 2024-11-20 RX ORDER — TAMSULOSIN HYDROCHLORIDE 0.4 MG/1
CAPSULE ORAL
Qty: 90 CAPSULE | Refills: 1 | Status: SHIPPED | OUTPATIENT
Start: 2024-11-20

## 2024-11-20 RX ORDER — LOSARTAN POTASSIUM 50 MG/1
TABLET ORAL
Qty: 90 TABLET | Refills: 1 | Status: SHIPPED | OUTPATIENT
Start: 2024-11-20

## 2024-12-04 DIAGNOSIS — M25.50 ARTHRALGIA, UNSPECIFIED JOINT: ICD-10-CM

## 2024-12-04 RX ORDER — ASPIRIN 81 MG/1
81 TABLET ORAL DAILY
Qty: 90 TABLET | Refills: 3 | Status: SHIPPED | OUTPATIENT
Start: 2024-12-04

## 2025-02-02 NOTE — PROGRESS NOTES
[facility]  Hand and Upper Extremity Service  Follow up visit         Follow up for: Right wrist     Interval History: He returns for his right wrist. He received a right wrist injection at last visit for STT joint arthritis. He states he has diminishing returns from his injections and doesn't believe the last injection helped much. He has a difficult time with hand shaking, pinching, gripping, and squeezing activities. He's here to discuss potential surgical options.               Past medical history, medications, allergies, surgical history and review of systems are reviewed and otherwise unchanged when compared to last visit on 6/10/24         Examination:  Constitutional: Oriented to person, place, and time.  Appears well-developed and well-nourished.  Head: Normocephalic and atraumatic.  Eyes: Pupils are equal, round, and reactive to light.  Cardiovascular: Intact distal pulses.  Pulmonary/Chest/Breast: Effort normal. No respiratory distress.  Neurological: Alert and oriented to person, place, and time.  Skin: Skin is warm and dry.  Psychiatric: normal mood and affect.  Behavior is normal.  Musculoskeletal: Right wrist reveals minimal tenderness over radial styloid. Negative Finkelstein maneuver. Significant tenderness to palpation dorsally over STT joint and moderate discomfort with palpation and manipulation of thumb CMC joint. Thumb MP joint is stable. No thenar atrophy. Full finger and thumb flexion without triggering.       Personal Interpretation of Diagnostic studies: X-rays of right wrist from December 2023 demonstrate advanced STT joint arthritis and moderate arthritic changes at thumb CMC joint.        Impression:  Right wrist pantrapezial arthritis       Plan: We've discussed treatment options and surgery may be his best ultimate option. We discussed what this would entail regarding trapeziectomy with ligament reconstruction of the thumb and partial excision of  proximal trapezoid. We discussed anticipated recovery and return to activities. His son is getting  in the early part of May so he's not able to get it done before them. He's requested repeat injections and a right comfort cool splint today. He'll call to schedule right thumb CMC joint reconstruction.       In Office Procedures Performed: Right STT joint injection   S Inj/Asp (R STT joint) on 2/3/2025 12:45 PM  Indications: pain  Details: 25 G needle, dorsal approach  Medications: 20 mg triamcinolone acetonide 40 mg/mL; 0.5 mL lidocaine 10 mg/mL (1 %)  Outcome: tolerated well, no immediate complications  Procedure, treatment alternatives, risks and benefits explained, specific risks discussed. Consent was given by the patient. Immediately prior to procedure a time out was called to verify the correct patient, procedure, equipment, support staff and site/side marked as required. Patient was prepped and draped in the usual sterile fashion.         Patient was prescribed a Comfort Cool for pantrapezial arthritis. The patient has weakness, instability and/or deformity of their right wrist which requires stabilization from this orthosis to improve their function.      Verbal and written instructions for the use, wear schedule, cleaning and application of this item were given.  Patient was instructed that should the brace result in increased pain, decreased sensation, increased swelling, or an overall worsening of their medical condition, to please contact our office immediately.     Orthotic management and training was provided for skin care, modifications due to healing tissues, edema changes, interruption in skin integrity, and safety precautions with the orthosis.        Follow up: Will call to schedule surgery     We discussed risks, benefits, alternatives and anticipated post op course including time away from work and activities following surgical treatment for the patient's condition. This is major surgery  with identifiable risks. No guarantees for success have been provided. The patient has expressed understanding and has elected to pursue operative treatment.        For Surgical Planning:  Diagnosis: Right wrist pantrapezial arthritis  Procedure: Right wrist CMC arthroplasty with FCR tendon transfer, partial trapezoid resection  CPT: 51724, 86688  Anesthesia: General With preoperative regional block  Duration: 1 hour  Special Equipment Needed: None  Medical Notes / PM / DM / PAT needed?:  PAT requested  Location: Any  Initial Post Operative Visit: 2 weeks               Herrera Helm MD  [facility]  Department of Orthopaedic Surgery  Hand and Upper Extremity Reconstruction    Scribe Attestation  By signing my name below, IMarry , Scribjeanette   attest that this documentation has been prepared under the direction and in the presence of Dr. Herrera Helm.    Dictation performed with the use of voice recognition software.  Syntax and grammatical errors may exist.

## 2025-02-03 ENCOUNTER — APPOINTMENT (OUTPATIENT)
Dept: ORTHOPEDIC SURGERY | Facility: CLINIC | Age: 60
End: 2025-02-03
Payer: COMMERCIAL

## 2025-02-03 VITALS — HEIGHT: 69 IN | BODY MASS INDEX: 27.85 KG/M2 | WEIGHT: 188 LBS

## 2025-02-03 DIAGNOSIS — M19.041 PANTRAPEZIAL ARTHRITIS, LOCALIZED PRIMARY, RIGHT: Primary | ICD-10-CM

## 2025-02-03 PROCEDURE — 1036F TOBACCO NON-USER: CPT | Performed by: ORTHOPAEDIC SURGERY

## 2025-02-03 PROCEDURE — 99214 OFFICE O/P EST MOD 30 MIN: CPT | Performed by: ORTHOPAEDIC SURGERY

## 2025-02-03 PROCEDURE — 3008F BODY MASS INDEX DOCD: CPT | Performed by: ORTHOPAEDIC SURGERY

## 2025-02-03 PROCEDURE — L3924 HFO WITHOUT JOINTS PRE OTS: HCPCS | Performed by: ORTHOPAEDIC SURGERY

## 2025-02-03 RX ORDER — LIDOCAINE HYDROCHLORIDE 10 MG/ML
0.5 INJECTION, SOLUTION INFILTRATION; PERINEURAL
Status: COMPLETED | OUTPATIENT
Start: 2025-02-03 | End: 2025-02-03

## 2025-02-03 RX ORDER — TRIAMCINOLONE ACETONIDE 40 MG/ML
20 INJECTION, SUSPENSION INTRA-ARTICULAR; INTRAMUSCULAR
Status: COMPLETED | OUTPATIENT
Start: 2025-02-03 | End: 2025-02-03

## 2025-02-03 RX ADMIN — LIDOCAINE HYDROCHLORIDE 0.5 ML: 10 INJECTION, SOLUTION INFILTRATION; PERINEURAL at 12:45

## 2025-02-03 RX ADMIN — TRIAMCINOLONE ACETONIDE 20 MG: 40 INJECTION, SUSPENSION INTRA-ARTICULAR; INTRAMUSCULAR at 12:45

## 2025-02-03 ASSESSMENT — PAIN SCALES - GENERAL: PAINLEVEL_OUTOF10: 4

## 2025-02-03 ASSESSMENT — PAIN DESCRIPTION - DESCRIPTORS: DESCRIPTORS: ACHING;SORE

## 2025-02-03 ASSESSMENT — PAIN - FUNCTIONAL ASSESSMENT: PAIN_FUNCTIONAL_ASSESSMENT: 0-10

## 2025-04-02 ENCOUNTER — TELEPHONE (OUTPATIENT)
Dept: PRIMARY CARE | Facility: CLINIC | Age: 60
End: 2025-04-02
Payer: COMMERCIAL

## 2025-04-02 NOTE — TELEPHONE ENCOUNTER
[facility] calling stating they need to have patient's chart notes faxed stating he is needing his CPAP. She stated the notes need to be prior to April 12, 2024. They can be reached at [phone] and the fax number is [phone].

## 2025-04-08 NOTE — PROGRESS NOTES
Subjective   Sunny Ray is a 60 y.o. male who presents for a physical exam .  HPI  Sunny is 60-year-old male is here for his yearly physical exam he takes medication as prescribed on losartan rosuvastatin tamsulosin for history of hypertension dyslipidemia and benign prostatic hypertrophy .  Patient has history of MADDY on CPAP, Patient claims machine broke using a new  CPAP with good results, sleeping well no am fatigue good day energy.   Patient is more interactive he is feeling well.  Denies chest pain shortness of breath fever chills nausea vomiting constipation diarrhea dysuria urgency frequency.  Review of Systems  10 systems are pertinent as above  Objective     Visit Vitals  /82   Pulse 78   Temp 36.5 °C (97.7 °F)   Resp 16        Physical Exam  HEENT: Atraumatic normocephalic the pupils are equal and round and reactive to light the sclerae nonicteric extraocular motion are intact.  Neck: Is supple without JVD no carotid bruits the trachea is midline there are no masses pulses are equal and bilateral with normal upstroke.  Skin: Normal.  Skin good texture.  Moist.  Good turgor.  No lesions, no rashes.  Lymph: No lymphadenopathy appreciated, no masses, no lesions  Lungs: Are clear to auscultation and percussion, good breath sounds bilaterally, no rhonchi, no wheezing, good diaphragmatic excursion.  Heart: Normal rate and normal rhythm S1, S2, no S3, no gallop, murmur or rub.  Abdomen: Soft, nontender, no organomegaly, good bowel sounds.    Extremities: Full range of motion, good pulses bilateral.  No cyanosis, no clubbing or edema.  Neuro: Cranial nerves II-XII are grossly intact there is no sensory or motor deficits.  Able to move all extremities.      Assessment/Plan     For physical exam    Fasting Blood work    CBC BMP LIPID AST ALT PSA vitamin D 25    MADDY  On Auto CPAP  Nightly use  Doing well sleeping well no snoring good daily energy    Prevention   Colonoscopy 02/21/23  PSA  11/2023    Rt hip replaced dr hall  Doing well    Left rhomboid strain  Range of motion      Immunization  Flu vaccine  valeria just had covid  Pneumonia vaccine age 64  Shingrix no  COVID-vaccine had covid early November 2023    Muscle statins  for two weeks for   Patient prefers to take in the setting of elevated calacium score      Slap injury left shoulder  Follows with Ortho     Continue with the low-fat, low-cholesterol diet,  I recommended Mediterranean diet, which include fish, chicken, vegetables and olive oil  Exercise daily for 30 minutes at least 3 times a week  Continue home medications    Hypertension  No added salt diet, do not and salt to your food  Try to exercise every other day for 30 minutes  Continue current medications    Enlarged prostate  nocturia    External hemorrhoid colorectal surgery  Re commanded observation    Anxiety and depression  On lexapro 5 mg daily   Wellbutrin 450 mg daily  Follows with psychiatry     Diverticulosis colonoscopy 2/21/23  Problem List Items Addressed This Visit       Nocturia    S/P total right hip arthroplasty    Depression    Dyslipidemia - Primary    Relevant Orders    Lipid Panel    AST    ALT    Physical exam    Relevant Orders    CBC w/5 Part Differential, [facility]    Basic Metabolic Panel    Lipid Panel    AST    ALT    Vitamin D 25-Hydroxy,Total (for eval of Vitamin D levels)    Arthritis of hip           Sy Houston MD

## 2025-04-15 ENCOUNTER — APPOINTMENT (OUTPATIENT)
Dept: PRIMARY CARE | Facility: CLINIC | Age: 60
End: 2025-04-15
Payer: COMMERCIAL

## 2025-04-15 VITALS
WEIGHT: 187 LBS | HEART RATE: 78 BPM | TEMPERATURE: 97.7 F | SYSTOLIC BLOOD PRESSURE: 124 MMHG | RESPIRATION RATE: 16 BRPM | BODY MASS INDEX: 27.7 KG/M2 | DIASTOLIC BLOOD PRESSURE: 82 MMHG | HEIGHT: 69 IN

## 2025-04-15 DIAGNOSIS — Z96.641 S/P TOTAL RIGHT HIP ARTHROPLASTY: ICD-10-CM

## 2025-04-15 DIAGNOSIS — E78.5 DYSLIPIDEMIA: Primary | ICD-10-CM

## 2025-04-15 DIAGNOSIS — Z00.00 PHYSICAL EXAM: ICD-10-CM

## 2025-04-15 DIAGNOSIS — M16.12 ARTHRITIS OF LEFT HIP: ICD-10-CM

## 2025-04-15 DIAGNOSIS — F32.A DEPRESSION, UNSPECIFIED DEPRESSION TYPE: ICD-10-CM

## 2025-04-15 DIAGNOSIS — R35.1 NOCTURIA: ICD-10-CM

## 2025-04-15 LAB
25(OH)D3 SERPL-MCNC: 18 NG/ML (ref 30–100)
ALT SERPL W P-5'-P-CCNC: 35 U/L (ref 14–59)
AST SERPL W P-5'-P-CCNC: 22 U/L (ref 15–37)
BASOPHILS # BLD AUTO: 0.02 X10*3/UL (ref 0.1–1.6)
BASOPHILS NFR BLD AUTO: 0.35 % (ref 0–0.3)
EOSINOPHIL # BLD AUTO: 0.05 X10*3/UL (ref 0.04–0.5)
EOSINOPHIL NFR BLD AUTO: 0.88 % (ref 0.7–7)
ERYTHROCYTE [DISTWIDTH] IN BLOOD BY AUTOMATED COUNT: 14.5 % (ref 11.5–14.5)
HCT VFR BLD AUTO: 39.1 % (ref 38.4–51.3)
HGB BLD-MCNC: 13.89 G/DL (ref 12.7–17)
LYMPHOCYTES # BLD AUTO: 0.94 X10*3/UL (ref 0–6)
LYMPHOCYTES NFR BLD AUTO: 17.24 % (ref 20.5–51.1)
MCH RBC QN AUTO: 29.1 PG (ref 26–32)
MCHC RBC AUTO-ENTMCNC: 35.5 G/DL (ref 31–38)
MCV RBC AUTO: 82 FL (ref 80–96)
MONOCYTES # BLD AUTO: 0.47 X10*3/UL (ref 1.6–24.9)
MONOCYTES NFR BLD AUTO: 8.68 % (ref 1.7–9.3)
NEUTROPHILS # BLD AUTO: 3.96 X10*3/UL (ref 1.4–6.5)
NEUTROPHILS NFR BLD AUTO: 72.85 % (ref 42.2–75.2)
PLATELET # BLD AUTO: 223.2 X10*3/UL (ref 150–450)
PMV BLD AUTO: 8.22 FL (ref 7.8–11)
PSA SERPL-MCNC: 1.22 NG/ML
RBC # BLD AUTO: 4.77 X10*6/UL (ref 4.1–5.6)
WBC # BLD AUTO: 5.43 X10*3/UL (ref 4.5–10.5)

## 2025-04-15 PROCEDURE — 84153 ASSAY OF PSA TOTAL: CPT | Performed by: INTERNAL MEDICINE

## 2025-04-15 PROCEDURE — 84460 ALANINE AMINO (ALT) (SGPT): CPT | Performed by: INTERNAL MEDICINE

## 2025-04-15 PROCEDURE — 82306 VITAMIN D 25 HYDROXY: CPT | Performed by: INTERNAL MEDICINE

## 2025-04-15 PROCEDURE — 85025 COMPLETE CBC W/AUTO DIFF WBC: CPT | Performed by: INTERNAL MEDICINE

## 2025-04-15 PROCEDURE — 80061 LIPID PANEL: CPT | Performed by: INTERNAL MEDICINE

## 2025-04-15 PROCEDURE — 84450 TRANSFERASE (AST) (SGOT): CPT | Performed by: INTERNAL MEDICINE

## 2025-04-15 PROCEDURE — 83721 ASSAY OF BLOOD LIPOPROTEIN: CPT | Performed by: INTERNAL MEDICINE

## 2025-04-15 PROCEDURE — 1036F TOBACCO NON-USER: CPT | Performed by: INTERNAL MEDICINE

## 2025-04-15 PROCEDURE — 99396 PREV VISIT EST AGE 40-64: CPT | Performed by: INTERNAL MEDICINE

## 2025-04-15 PROCEDURE — 80048 BASIC METABOLIC PNL TOTAL CA: CPT | Performed by: INTERNAL MEDICINE

## 2025-04-15 PROCEDURE — 3008F BODY MASS INDEX DOCD: CPT | Performed by: INTERNAL MEDICINE

## 2025-04-15 ASSESSMENT — PAIN SCALES - GENERAL: PAINLEVEL_OUTOF10: 0-NO PAIN

## 2025-04-16 LAB
ANION GAP SERPL CALC-SCNC: 13 MMOL/L (ref 10–20)
BUN SERPL-MCNC: 14 MG/DL (ref 7–18)
CALCIUM SERPL-MCNC: 9.1 MG/DL (ref 8.5–10.1)
CHLORIDE SERPL-SCNC: 103 MMOL/L (ref 98–107)
CHOLEST SERPL-MCNC: 128 MG/DL (ref 0–199)
CHOLESTEROL/HDL RATIO: 2.1 (ref 4.2–7)
CO2 SERPL-SCNC: 29 MMOL/L (ref 21–32)
CREAT SERPL-MCNC: 0.93 MG/DL (ref 0.6–1.1)
EGFRCR SERPLBLD CKD-EPI 2021: >90 ML/MIN/1.73M*2
GLUCOSE SERPL-MCNC: 88 MG/DL (ref 74–100)
HDLC SERPL-MCNC: 61 MG/DL (ref 40–59)
IS PATIENT FASTING: YES
LDLC SERPL DIRECT ASSAY-MCNC: 51 MG/DL (ref 0–100)
POTASSIUM SERPL-SCNC: 4.2 MMOL/L (ref 3.5–5.1)
SODIUM SERPL-SCNC: 141 MMOL/L (ref 136–145)
TRIGL SERPL-MCNC: 65 MG/DL

## 2025-06-06 ENCOUNTER — TELEPHONE (OUTPATIENT)
Dept: PRIMARY CARE | Facility: CLINIC | Age: 60
End: 2025-06-06
Payer: COMMERCIAL

## 2025-06-06 NOTE — TELEPHONE ENCOUNTER
"[facility] was here today, Are you able to add an addendum to Sunny's last office note stating   \"Patient is using and benefiting from the use of his CPAP machine.   "

## 2025-06-18 DIAGNOSIS — I10 ESSENTIAL (PRIMARY) HYPERTENSION: ICD-10-CM

## 2025-06-18 DIAGNOSIS — E78.5 HYPERLIPIDEMIA, UNSPECIFIED: ICD-10-CM

## 2025-06-18 DIAGNOSIS — R35.1 NOCTURIA: ICD-10-CM

## 2025-06-19 RX ORDER — ROSUVASTATIN CALCIUM 20 MG/1
20 TABLET, COATED ORAL DAILY
Qty: 90 TABLET | Refills: 2 | Status: SHIPPED | OUTPATIENT
Start: 2025-06-19

## 2025-06-19 RX ORDER — LOSARTAN POTASSIUM 50 MG/1
50 TABLET ORAL DAILY
Qty: 90 TABLET | Refills: 1 | Status: SHIPPED | OUTPATIENT
Start: 2025-06-19

## 2025-06-19 RX ORDER — TAMSULOSIN HYDROCHLORIDE 0.4 MG/1
0.4 CAPSULE ORAL DAILY
Qty: 90 CAPSULE | Refills: 1 | Status: SHIPPED | OUTPATIENT
Start: 2025-06-19